# Patient Record
Sex: MALE | Race: WHITE | NOT HISPANIC OR LATINO | Employment: FULL TIME | URBAN - NONMETROPOLITAN AREA
[De-identification: names, ages, dates, MRNs, and addresses within clinical notes are randomized per-mention and may not be internally consistent; named-entity substitution may affect disease eponyms.]

---

## 2023-10-27 ENCOUNTER — TELEPHONE (OUTPATIENT)
Dept: UROLOGY | Facility: CLINIC | Age: 63
End: 2023-10-27

## 2023-10-27 ENCOUNTER — PATIENT MESSAGE (OUTPATIENT)
Dept: UROLOGY | Facility: CLINIC | Age: 63
End: 2023-10-27

## 2023-10-27 RX ORDER — METOPROLOL TARTRATE 50 MG/1
50 TABLET, FILM COATED ORAL EVERY 12 HOURS SCHEDULED
COMMUNITY

## 2023-10-27 RX ORDER — TRAMADOL HYDROCHLORIDE 50 MG/1
50 TABLET ORAL EVERY 6 HOURS PRN
COMMUNITY

## 2023-10-27 RX ORDER — CLONIDINE HYDROCHLORIDE 0.2 MG/1
0.2 TABLET ORAL 3 TIMES DAILY
COMMUNITY

## 2023-10-27 RX ORDER — TAMSULOSIN HYDROCHLORIDE 0.4 MG/1
0.4 CAPSULE ORAL
COMMUNITY

## 2023-10-27 RX ORDER — ALLOPURINOL 300 MG/1
300 TABLET ORAL DAILY
COMMUNITY

## 2023-10-27 RX ORDER — HYDROCHLOROTHIAZIDE 25 MG/1
25 TABLET ORAL 2 TIMES DAILY
COMMUNITY
End: 2023-10-27 | Stop reason: ALTCHOICE

## 2023-10-27 RX ORDER — CLONIDINE HYDROCHLORIDE 0.1 MG/1
0.1 TABLET ORAL 2 TIMES DAILY
COMMUNITY
End: 2023-10-27 | Stop reason: DRUGHIGH

## 2023-10-27 RX ORDER — VALSARTAN 160 MG/1
160 TABLET ORAL DAILY
COMMUNITY
End: 2023-10-27 | Stop reason: ALTCHOICE

## 2023-10-27 NOTE — TELEPHONE ENCOUNTER
Client called back in,Rank[facility] link sent to patient they will upload PSA results from [facility] to the chart, and bring copy in day of appt.   I also added PCP and Dr. Shelly Brasher to care team.

## 2023-10-29 PROBLEM — N40.1 BENIGN PROSTATIC HYPERPLASIA WITH LOWER URINARY TRACT SYMPTOMS: Status: ACTIVE | Noted: 2023-10-29

## 2023-10-29 PROBLEM — Z87.448 HISTORY OF HEMATURIA: Status: ACTIVE | Noted: 2023-10-29

## 2023-10-29 NOTE — PROGRESS NOTES
UROLOGY PROGRESS NOTE         NAME: Sy Yusuf  AGE: 61 y.o. SEX: male  : 1960   MRN: [id number]    DATE: 2023  TIME: 9:21 AM    Assessment and Plan   Procedures     Impression:   1. Benign prostatic hyperplasia with lower urinary tract symptoms, symptom details unspecified    2. History of hematuria    3. Urinary frequency    4. Urinary urgency    5. Nocturia    6. Decreased urine stream         Plan: Plan is a set patient up for ultrasound sizing of his prostate and cystoscopy and literature on the UroLift procedure. We will evaluate for possible candidate for the UroLift procedure patient agrees. We will also get another PSA for the yearly checkup. Chief Complaint     Chief Complaint   Patient presents with    New Patient Visit     Here to discuss enlarged prostate       History of Present Illness     HPI: Sy Yusuf is a 61y.o. year old male who presents with evaluation for BPH. Patient on Flomax. PSA 0.16    Renal ultrasound 2020 showed normal upper tracts. This was done for microscopic hematuria. I do not know if he had a cystoscopy. He was with the [address] [place] [address]. He saw Dr. Mitali Lainez    From  with [facility] urology patient was on Uroxatrol for BPH. Not see a recent PSA  Patient states does not like how he feels on Flomax a lot of nasal congestion. Also did not do well with Uroxatrol. We discussed finasteride but he is interesting in a minimally invasive surgical procedure like a UroLift. His symptoms include urgency frequency without urge incontinence. Decreased flow no hesitancy no hematuria no dysuria he has nocturia x6 or every hour. He also has incomplete emptying    No erectile dysfunction. Patient states with his history of microhematuria again he had an upper tract study but never had the cystoscopy. He states his PSA 0.16 was about a year ago.     The following portions of the patient's history were reviewed and updated as appropriate: allergies, current medications, past family history, past medical history, past social history, past surgical history and problem list.  Past Medical History:   Diagnosis Date    Anxiety     Benign prostatic hyperplasia     Gout     Hematuria     Hypertension      Past Surgical History:   Procedure Laterality Date    COLONOSCOPY      ESOPHAGOSCOPY  2021    HIP ARTHROPLASTY Bilateral     LAMINECTOMY AND MICRODISCECTOMY LUMBAR SPINE  2007    SACROILIAC JOINT FUSION       shoulder  Review of Systems     Const: Denies chills, fever and weight loss. CV: Denies chest pain. Resp: Denies SOB. GI: Denies abdominal pain, nausea and vomiting. : Denies symptoms other than stated above. Musculo: Denies back pain. Objective   /98 (BP Location: Left arm, Patient Position: Sitting)   Pulse 61   Temp 97.5 °F (36.4 °C)   Wt 109 kg (241 lb 6.4 oz)   SpO2 97%     Physical Exam  Const: Appears healthy and well developed. No signs of acute distress present. Resp: Respirations are regular and unlabored. CV: Rate is regular. Rhythm is regular. Abdomen: Abdomen is soft, nontender, and nondistended. Kidneys are not palpable. : Normal external genitalia exam prostate enlarged but smooth no masses or nodules  Psych: Patient's attitude is cooperative.  Mood is normal. Affect is normal.    Current Medications     Current Outpatient Medications:     allopurinol (ZYLOPRIM) 300 mg tablet, Take 300 mg by mouth daily, Disp: , Rfl:     Cholecalciferol (Vitamin D) 50 MCG (2000 UT) tablet, Take 2,000 Units by mouth daily, Disp: , Rfl:     cloNIDine (Catapres) 0.2 mg tablet, Take 0.2 mg by mouth 3 (three) times a day, Disp: , Rfl:     metoprolol tartrate (LOPRESSOR) 50 mg tablet, Take 50 mg by mouth every 12 (twelve) hours, Disp: , Rfl:     sertraline (ZOLOFT) 50 mg tablet, Take 50 mg by mouth daily, Disp: , Rfl:     tamsulosin (FLOMAX) 0.4 mg, Take 0.4 mg by mouth, Disp: , Rfl:     traMADol (ULTRAM) 50 mg tablet, Take 50 mg by mouth every 6 (six) hours as needed, Disp: , Rfl:     valsartan 160 mg TABS 160 mg, hydrochlorothiazide 25 mg TABS 25 mg, Take by mouth 2 (two) times a day, Disp: , Rfl:         Gertrude Martinez MD

## 2023-11-01 ENCOUNTER — OFFICE VISIT (OUTPATIENT)
Dept: UROLOGY | Facility: CLINIC | Age: 63
End: 2023-11-01
Payer: COMMERCIAL

## 2023-11-01 VITALS
WEIGHT: 241.4 LBS | DIASTOLIC BLOOD PRESSURE: 98 MMHG | TEMPERATURE: 97.5 F | SYSTOLIC BLOOD PRESSURE: 170 MMHG | HEART RATE: 61 BPM | OXYGEN SATURATION: 97 %

## 2023-11-01 DIAGNOSIS — R35.1 NOCTURIA: ICD-10-CM

## 2023-11-01 DIAGNOSIS — N40.1 BENIGN PROSTATIC HYPERPLASIA WITH LOWER URINARY TRACT SYMPTOMS, SYMPTOM DETAILS UNSPECIFIED: Primary | ICD-10-CM

## 2023-11-01 DIAGNOSIS — R39.15 URINARY URGENCY: ICD-10-CM

## 2023-11-01 DIAGNOSIS — R39.198 DECREASED URINE STREAM: ICD-10-CM

## 2023-11-01 DIAGNOSIS — R35.0 URINARY FREQUENCY: ICD-10-CM

## 2023-11-01 DIAGNOSIS — Z87.448 HISTORY OF HEMATURIA: ICD-10-CM

## 2023-11-01 PROCEDURE — 99204 OFFICE O/P NEW MOD 45 MIN: CPT | Performed by: UROLOGY

## 2023-11-01 RX ORDER — CHOLECALCIFEROL (VITAMIN D3) 50 MCG
2000 TABLET ORAL DAILY
COMMUNITY
End: 2023-11-03 | Stop reason: ALTCHOICE

## 2023-11-03 ENCOUNTER — TELEPHONE (OUTPATIENT)
Dept: UROLOGY | Facility: CLINIC | Age: 63
End: 2023-11-03

## 2023-11-03 NOTE — TELEPHONE ENCOUNTER
Received in box message from patient with current medication list. Meds updated per patient request.

## 2023-11-03 NOTE — TELEPHONE ENCOUNTER
----- Message from Sri Myers sent at 11/2/2023  9:18 PM EDT -----  Regarding: Alicia/[facility]  Contact: [phone]  The attached medicine list is current. The only thing that should have been added was vitamin d 50mcg (2000iu).

## 2023-11-04 LAB — PSA SERPL-MCNC: 0.22 NG/ML

## 2023-11-14 NOTE — H&P (VIEW-ONLY)
UROLOGY PROGRESS NOTE         NAME: Epifanio Lainez  AGE: 61 y.o. SEX: male  : 1960   MRN: [id number]    DATE: 2023  TIME: 6:05 AM    Assessment and Plan      Cystoscopy     Date/Time  2023 1:30 PM     Performed by  Fred Parsons MD   Authorized by  Fred Parsons MD         Procedure Details:  Procedure type: cystoscopy    Additional Procedure Details: Patient was placed in left lateral cubitus position and transrectal ultrasound was used to measure the prostate in the AP and lateral views with a finding of 30 g prostate. Patient then placed prone cystoscopy was carried out after he was prepped and draped in usual sterile fashion. He had a normal anterior and posterior urethra he had he had enlarged lateral lobes of his prostate with a median size median lobe. There was no median lobe component. The bladder was mild trabeculation otherwise normal.  No tumor no stones no cystitis. Patient tolerated well       Impression:   1. Benign prostatic hyperplasia with urinary frequency    2. History of hematuria    3. Urinary frequency    4. Urinary urgency    5. Nocturia    6. Decreased urine stream         Plan: I think the patient is a good candidate for either Rezum or UroLift. I went over the procedures and the risk and benefits and the patient elects to the UroLift procedure likely 4 to 6 units. Chief Complaint   No chief complaint on file. History of Present Illness     HPI: Epifanio Lainez is a 61y.o. year old male who presents with follow-up office visit 2023. Patient on Flomax with a history of BPH and a normal PSA of 0.16. Patient is seen [facility] urology was on Uroxatrol. He is also seen Dr. Maria D Angel in the past for microhematuria. Flomax causes nasal congestion and did not do well with Uroxatrol and we discussed other medical options but he is possibly interested in a UroLift. He is here for sizing of prostate and cystoscopy.   His symptoms include frequency urgency without urge incontinence nocturia x6. Also incomplete emptying and decreased stream.    Please note his recent PSA was 0.22 on 11/3/2023              The following portions of the patient's history were reviewed and updated as appropriate: allergies, current medications, past family history, past medical history, past social history, past surgical history and problem list.  Past Medical History:   Diagnosis Date    Anxiety     Benign prostatic hyperplasia     Gout     Hematuria     Hypertension      Past Surgical History:   Procedure Laterality Date    COLONOSCOPY      ESOPHAGOSCOPY  2021    HIP ARTHROPLASTY Bilateral     LAMINECTOMY AND MICRODISCECTOMY LUMBAR SPINE  2007    SACROILIAC JOINT FUSION       shoulder  Review of Systems     Const: Denies chills, fever and weight loss. CV: Denies chest pain. Resp: Denies SOB. GI: Denies abdominal pain, nausea and vomiting. : Denies symptoms other than stated above. Musculo: Denies back pain. Objective   There were no vitals taken for this visit. Physical Exam  Const: Appears healthy and well developed. No signs of acute distress present. Resp: Respirations are regular and unlabored. CV: Rate is regular. Rhythm is regular. Abdomen: Abdomen is soft, nontender, and nondistended. Kidneys are not palpable. : nl  Psych: Patient's attitude is cooperative.  Mood is normal. Affect is normal.    Current Medications     Current Outpatient Medications:     allopurinol (ZYLOPRIM) 300 mg tablet, Take 300 mg by mouth daily, Disp: , Rfl:     cloNIDine (Catapres) 0.2 mg tablet, Take 0.2 mg by mouth 3 (three) times a day, Disp: , Rfl:     metoprolol tartrate (LOPRESSOR) 50 mg tablet, Take 50 mg by mouth every 12 (twelve) hours, Disp: , Rfl:     sertraline (ZOLOFT) 50 mg tablet, Take 50 mg by mouth daily, Disp: , Rfl:     tamsulosin (FLOMAX) 0.4 mg, Take 0.4 mg by mouth, Disp: , Rfl:     traMADol (ULTRAM) 50 mg tablet, Take 50 mg by mouth every 6 (six) hours as needed, Disp: , Rfl:     valsartan 160 mg TABS 160 mg, hydrochlorothiazide 25 mg TABS 25 mg, Take by mouth 2 (two) times a day, Disp: , Rfl:         Fred Parsons MD

## 2023-11-14 NOTE — PROGRESS NOTES
UROLOGY PROGRESS NOTE         NAME: Hugh Gutierrez  AGE: 61 y.o. SEX: male  : 1960   MRN: [id number]    DATE: 2023  TIME: 6:05 AM    Assessment and Plan      Cystoscopy     Date/Time  2023 1:30 PM     Performed by  Gris Garcia MD   Authorized by  Gris Garcia MD         Procedure Details:  Procedure type: cystoscopy    Additional Procedure Details: Patient was placed in left lateral cubitus position and transrectal ultrasound was used to measure the prostate in the AP and lateral views with a finding of 30 g prostate. Patient then placed prone cystoscopy was carried out after he was prepped and draped in usual sterile fashion. He had a normal anterior and posterior urethra he had he had enlarged lateral lobes of his prostate with a median size median lobe. There was no median lobe component. The bladder was mild trabeculation otherwise normal.  No tumor no stones no cystitis. Patient tolerated well       Impression:   1. Benign prostatic hyperplasia with urinary frequency    2. History of hematuria    3. Urinary frequency    4. Urinary urgency    5. Nocturia    6. Decreased urine stream         Plan: I think the patient is a good candidate for either Rezum or UroLift. I went over the procedures and the risk and benefits and the patient elects to the UroLift procedure likely 4 to 6 units. Chief Complaint   No chief complaint on file. History of Present Illness     HPI: Hugh Gutierrez is a 61y.o. year old male who presents with follow-up office visit 2023. Patient on Flomax with a history of BPH and a normal PSA of 0.16. Patient is seen [facility] urology was on Uroxatrol. He is also seen Dr. Dina Walker in the past for microhematuria. Flomax causes nasal congestion and did not do well with Uroxatrol and we discussed other medical options but he is possibly interested in a UroLift. He is here for sizing of prostate and cystoscopy.   His symptoms include frequency urgency without urge incontinence nocturia x6. Also incomplete emptying and decreased stream.    Please note his recent PSA was 0.22 on 11/3/2023              The following portions of the patient's history were reviewed and updated as appropriate: allergies, current medications, past family history, past medical history, past social history, past surgical history and problem list.  Past Medical History:   Diagnosis Date    Anxiety     Benign prostatic hyperplasia     Gout     Hematuria     Hypertension      Past Surgical History:   Procedure Laterality Date    COLONOSCOPY      ESOPHAGOSCOPY  2021    HIP ARTHROPLASTY Bilateral     LAMINECTOMY AND MICRODISCECTOMY LUMBAR SPINE  2007    SACROILIAC JOINT FUSION       shoulder  Review of Systems     Const: Denies chills, fever and weight loss. CV: Denies chest pain. Resp: Denies SOB. GI: Denies abdominal pain, nausea and vomiting. : Denies symptoms other than stated above. Musculo: Denies back pain. Objective   There were no vitals taken for this visit. Physical Exam  Const: Appears healthy and well developed. No signs of acute distress present. Resp: Respirations are regular and unlabored. CV: Rate is regular. Rhythm is regular. Abdomen: Abdomen is soft, nontender, and nondistended. Kidneys are not palpable. : nl  Psych: Patient's attitude is cooperative.  Mood is normal. Affect is normal.    Current Medications     Current Outpatient Medications:     allopurinol (ZYLOPRIM) 300 mg tablet, Take 300 mg by mouth daily, Disp: , Rfl:     cloNIDine (Catapres) 0.2 mg tablet, Take 0.2 mg by mouth 3 (three) times a day, Disp: , Rfl:     metoprolol tartrate (LOPRESSOR) 50 mg tablet, Take 50 mg by mouth every 12 (twelve) hours, Disp: , Rfl:     sertraline (ZOLOFT) 50 mg tablet, Take 50 mg by mouth daily, Disp: , Rfl:     tamsulosin (FLOMAX) 0.4 mg, Take 0.4 mg by mouth, Disp: , Rfl:     traMADol (ULTRAM) 50 mg tablet, Take 50 mg by mouth every 6 (six) hours as needed, Disp: , Rfl:     valsartan 160 mg TABS 160 mg, hydrochlorothiazide 25 mg TABS 25 mg, Take by mouth 2 (two) times a day, Disp: , Rfl:         Monica Huffman MD

## 2023-11-16 ENCOUNTER — PROCEDURE VISIT (OUTPATIENT)
Dept: UROLOGY | Facility: CLINIC | Age: 63
End: 2023-11-16
Payer: COMMERCIAL

## 2023-11-16 ENCOUNTER — PREP FOR PROCEDURE (OUTPATIENT)
Dept: UROLOGY | Facility: CLINIC | Age: 63
End: 2023-11-16

## 2023-11-16 VITALS
HEART RATE: 59 BPM | HEIGHT: 67 IN | SYSTOLIC BLOOD PRESSURE: 150 MMHG | DIASTOLIC BLOOD PRESSURE: 86 MMHG | OXYGEN SATURATION: 100 % | BODY MASS INDEX: 37.67 KG/M2 | TEMPERATURE: 97 F | WEIGHT: 240 LBS

## 2023-11-16 DIAGNOSIS — R35.0 BENIGN PROSTATIC HYPERPLASIA WITH URINARY FREQUENCY: Primary | ICD-10-CM

## 2023-11-16 DIAGNOSIS — R35.1 NOCTURIA: ICD-10-CM

## 2023-11-16 DIAGNOSIS — Z01.818 ENCOUNTER FOR PREADMISSION TESTING: Primary | ICD-10-CM

## 2023-11-16 DIAGNOSIS — R39.89 SUSPECTED UTI: ICD-10-CM

## 2023-11-16 DIAGNOSIS — Z87.448 HISTORY OF HEMATURIA: ICD-10-CM

## 2023-11-16 DIAGNOSIS — R35.0 URINARY FREQUENCY: ICD-10-CM

## 2023-11-16 DIAGNOSIS — R39.198 DECREASED URINE STREAM: ICD-10-CM

## 2023-11-16 DIAGNOSIS — R39.15 URINARY URGENCY: ICD-10-CM

## 2023-11-16 DIAGNOSIS — N40.1 BENIGN PROSTATIC HYPERPLASIA WITH URINARY FREQUENCY: Primary | ICD-10-CM

## 2023-11-16 PROCEDURE — 52000 CYSTOURETHROSCOPY: CPT | Performed by: UROLOGY

## 2023-11-16 PROCEDURE — 76872 US TRANSRECTAL: CPT | Performed by: UROLOGY

## 2023-11-16 RX ORDER — OMEGA-3S/DHA/EPA/FISH OIL/D3 300MG-1000
2000 CAPSULE ORAL DAILY
COMMUNITY

## 2023-11-27 ENCOUNTER — PATIENT MESSAGE (OUTPATIENT)
Dept: UROLOGY | Facility: CLINIC | Age: 63
End: 2023-11-27

## 2023-11-30 ENCOUNTER — TELEPHONE (OUTPATIENT)
Dept: UROLOGY | Facility: CLINIC | Age: 63
End: 2023-11-30

## 2023-11-30 NOTE — TELEPHONE ENCOUNTER
----- Message from Parish So sent at 11/30/2023  2:11 PM EST -----  Regarding: Test results   Contact: [phone]  I see in my chart that you got my results of the urine culture from [facility]. Did you also receive my blood work results? If not, I will send them to you.

## 2023-12-13 RX ORDER — MULTIVITAMIN
1 TABLET ORAL DAILY
COMMUNITY

## 2023-12-13 RX ORDER — ACETAMINOPHEN 500 MG
1000 TABLET ORAL EVERY 6 HOURS PRN
COMMUNITY

## 2023-12-13 NOTE — PRE-PROCEDURE INSTRUCTIONS
Pre-Surgery Instructions:   Medication Instructions    acetaminophen (TYLENOL) 500 mg tablet Uses PRN- OK to take day of surgery    allopurinol (ZYLOPRIM) 300 mg tablet Take day of surgery. cholecalciferol (VITAMIN D3) 400 units tablet Stop taking 7 days prior to surgery. cloNIDine (Catapres) 0.2 mg tablet Take day of surgery. metoprolol tartrate (LOPRESSOR) 50 mg tablet Take day of surgery. Multiple Vitamin (multivitamin) tablet Stop taking 7 days prior to surgery. sertraline (ZOLOFT) 50 mg tablet Take day of surgery. tamsulosin (FLOMAX) 0.4 mg Hold day of surgery. traMADol (ULTRAM) 50 mg tablet Uses PRN- OK to take day of surgery    valsartan 160 mg TABS 160 mg, hydrochlorothiazide 25 mg TABS 25 mg Hold day of surgery. Medication instructions for day surgery reviewed. Please use only a sip of water to take your instructed medications. Avoid all over the counter vitamins, supplements and NSAIDS for one week prior to surgery per anesthesia guidelines. Tylenol is ok to take as needed. You will receive a call one business day prior to surgery with an arrival time and hospital directions. If your surgery is scheduled on a Monday, the hospital will be calling you on the Friday prior to your surgery. If you have not heard from anyone by 8pm, please call the hospital supervisor through the hospital  at [phone]. Octaviano Abbott [phone]). Do not eat or drink anything after midnight the night before your surgery, including candy, mints, lifesavers, or chewing gum. Do not drink alcohol 24hrs before your surgery. Try not to smoke at least 24hrs before your surgery. Follow the pre surgery showering instructions as listed in the [facility] Surgical Experience Booklet” or otherwise provided by your surgeon's office. Do not use a blade to shave the surgical area 1 week before surgery. It is okay to use a clean electric clippers up to 24 hours before surgery.  Do not apply any lotions, creams, including makeup, cologne, deodorant, or perfumes after showering on the day of your surgery. Do not use dry shampoo, hair spray, hair gel, or any type of hair products. No contact lenses, eye make-up, or artificial eyelashes. Remove nail polish, including gel polish, and any artificial, gel, or acrylic nails if possible. Remove all jewelry including rings and body piercing jewelry. Wear causal clothing that is easy to take on and off. Consider your type of surgery. Keep any valuables, jewelry, piercings at home. Please bring any specially ordered equipment (sling, braces) if indicated. Arrange for a responsible person to drive you to and from the hospital on the day of your surgery. Visitor Guidelines discussed. Call the surgeon's office with any new illnesses, exposures, or additional questions prior to surgery. Please reference your [facility] Surgical Experience Booklet” for additional information to prepare for your upcoming surgery.

## 2023-12-15 ENCOUNTER — ANESTHESIA EVENT (OUTPATIENT)
Dept: PERIOP | Facility: HOSPITAL | Age: 63
End: 2023-12-15
Payer: COMMERCIAL

## 2023-12-15 ENCOUNTER — ANESTHESIA (OUTPATIENT)
Dept: PERIOP | Facility: HOSPITAL | Age: 63
End: 2023-12-15
Payer: COMMERCIAL

## 2023-12-15 ENCOUNTER — HOSPITAL ENCOUNTER (OUTPATIENT)
Facility: HOSPITAL | Age: 63
Setting detail: OUTPATIENT SURGERY
Discharge: HOME | End: 2023-12-15
Attending: UROLOGY | Admitting: UROLOGY
Payer: COMMERCIAL

## 2023-12-15 VITALS
WEIGHT: 240 LBS | HEART RATE: 62 BPM | DIASTOLIC BLOOD PRESSURE: 58 MMHG | BODY MASS INDEX: 37.67 KG/M2 | HEIGHT: 67 IN | TEMPERATURE: 97.6 F | SYSTOLIC BLOOD PRESSURE: 128 MMHG | OXYGEN SATURATION: 95 % | RESPIRATION RATE: 16 BRPM

## 2023-12-15 DIAGNOSIS — R35.0 BENIGN PROSTATIC HYPERPLASIA WITH URINARY FREQUENCY: Primary | ICD-10-CM

## 2023-12-15 DIAGNOSIS — N40.1 BENIGN PROSTATIC HYPERPLASIA WITH URINARY FREQUENCY: Primary | ICD-10-CM

## 2023-12-15 PROBLEM — I10 HYPERTENSION: Status: ACTIVE | Noted: 2023-12-15

## 2023-12-15 PROBLEM — R11.2 PONV (POSTOPERATIVE NAUSEA AND VOMITING): Status: ACTIVE | Noted: 2023-12-15

## 2023-12-15 PROBLEM — F32.A DEPRESSION: Status: ACTIVE | Noted: 2023-12-15

## 2023-12-15 PROBLEM — F41.9 ANXIETY: Status: ACTIVE | Noted: 2023-12-15

## 2023-12-15 PROBLEM — Z98.890 PONV (POSTOPERATIVE NAUSEA AND VOMITING): Status: ACTIVE | Noted: 2023-12-15

## 2023-12-15 PROBLEM — G89.4 CHRONIC PAIN DISORDER: Status: ACTIVE | Noted: 2023-12-15

## 2023-12-15 PROCEDURE — 52441 CYSTO INSJ TRNSPRSTC 1 IMPLT: CPT | Performed by: UROLOGY

## 2023-12-15 PROCEDURE — 52442 CYSTO INS TRNSPRSTC IMPLT EA: CPT | Performed by: UROLOGY

## 2023-12-15 PROCEDURE — L8699 PROSTHETIC IMPLANT NOS: HCPCS | Performed by: UROLOGY

## 2023-12-15 DEVICE — IMPLANT CARTRIDGE UROLIFT 2 HANDLE KIT: Type: IMPLANTABLE DEVICE | Site: URETHRA | Status: FUNCTIONAL

## 2023-12-15 DEVICE — IMPLANT CARTRIDGE UROLIFT 2: Type: IMPLANTABLE DEVICE | Site: URETHRA | Status: FUNCTIONAL

## 2023-12-15 RX ORDER — PHENAZOPYRIDINE HYDROCHLORIDE 200 MG/1
200 TABLET, FILM COATED ORAL
Qty: 10 TABLET | Refills: 0 | Status: SHIPPED | OUTPATIENT
Start: 2023-12-15

## 2023-12-15 RX ORDER — PROPOFOL 10 MG/ML
INJECTION, EMULSION INTRAVENOUS AS NEEDED
Status: DISCONTINUED | OUTPATIENT
Start: 2023-12-15 | End: 2023-12-15

## 2023-12-15 RX ORDER — ONDANSETRON 2 MG/ML
INJECTION INTRAMUSCULAR; INTRAVENOUS AS NEEDED
Status: DISCONTINUED | OUTPATIENT
Start: 2023-12-15 | End: 2023-12-15

## 2023-12-15 RX ORDER — KETAMINE HCL IN NACL, ISO-OSM 100MG/10ML
SYRINGE (ML) INJECTION AS NEEDED
Status: DISCONTINUED | OUTPATIENT
Start: 2023-12-15 | End: 2023-12-15

## 2023-12-15 RX ORDER — MIDAZOLAM HYDROCHLORIDE 2 MG/2ML
INJECTION, SOLUTION INTRAMUSCULAR; INTRAVENOUS AS NEEDED
Status: DISCONTINUED | OUTPATIENT
Start: 2023-12-15 | End: 2023-12-15

## 2023-12-15 RX ORDER — ONDANSETRON 2 MG/ML
4 INJECTION INTRAMUSCULAR; INTRAVENOUS ONCE AS NEEDED
Status: DISCONTINUED | OUTPATIENT
Start: 2023-12-15 | End: 2023-12-15 | Stop reason: HOSPADM

## 2023-12-15 RX ORDER — FENTANYL CITRATE 50 UG/ML
INJECTION, SOLUTION INTRAMUSCULAR; INTRAVENOUS AS NEEDED
Status: DISCONTINUED | OUTPATIENT
Start: 2023-12-15 | End: 2023-12-15

## 2023-12-15 RX ORDER — HYDROCODONE BITARTRATE AND ACETAMINOPHEN 5; 325 MG/1; MG/1
1 TABLET ORAL EVERY 6 HOURS PRN
Status: DISCONTINUED | OUTPATIENT
Start: 2023-12-15 | End: 2023-12-15 | Stop reason: HOSPADM

## 2023-12-15 RX ORDER — SODIUM CHLORIDE, SODIUM LACTATE, POTASSIUM CHLORIDE, CALCIUM CHLORIDE 600; 310; 30; 20 MG/100ML; MG/100ML; MG/100ML; MG/100ML
INJECTION, SOLUTION INTRAVENOUS CONTINUOUS PRN
Status: DISCONTINUED | OUTPATIENT
Start: 2023-12-15 | End: 2023-12-15

## 2023-12-15 RX ORDER — PHENAZOPYRIDINE HYDROCHLORIDE 100 MG/1
200 TABLET, FILM COATED ORAL ONCE AS NEEDED
Status: DISCONTINUED | OUTPATIENT
Start: 2023-12-15 | End: 2023-12-15 | Stop reason: HOSPADM

## 2023-12-15 RX ORDER — CEFAZOLIN SODIUM 2 G/50ML
2000 SOLUTION INTRAVENOUS ONCE
Status: COMPLETED | OUTPATIENT
Start: 2023-12-15 | End: 2023-12-15

## 2023-12-15 RX ORDER — CEFUROXIME AXETIL 500 MG/1
500 TABLET ORAL EVERY 12 HOURS SCHEDULED
Qty: 6 TABLET | Refills: 0 | Status: SHIPPED | OUTPATIENT
Start: 2023-12-15 | End: 2023-12-18

## 2023-12-15 RX ADMIN — PROPOFOL 20 MG: 10 INJECTION, EMULSION INTRAVENOUS at 12:16

## 2023-12-15 RX ADMIN — FENTANYL CITRATE 50 MCG: 50 INJECTION INTRAMUSCULAR; INTRAVENOUS at 12:11

## 2023-12-15 RX ADMIN — PROPOFOL 20 MG: 10 INJECTION, EMULSION INTRAVENOUS at 12:14

## 2023-12-15 RX ADMIN — PROPOFOL 50 MG: 10 INJECTION, EMULSION INTRAVENOUS at 12:11

## 2023-12-15 RX ADMIN — PROPOFOL 30 MG: 10 INJECTION, EMULSION INTRAVENOUS at 12:23

## 2023-12-15 RX ADMIN — ONDANSETRON 4 MG: 2 INJECTION INTRAMUSCULAR; INTRAVENOUS at 12:25

## 2023-12-15 RX ADMIN — Medication 20 MG: at 12:18

## 2023-12-15 RX ADMIN — MIDAZOLAM 2 MG: 1 INJECTION INTRAMUSCULAR; INTRAVENOUS at 12:05

## 2023-12-15 RX ADMIN — PROPOFOL 30 MG: 10 INJECTION, EMULSION INTRAVENOUS at 12:22

## 2023-12-15 RX ADMIN — CEFAZOLIN SODIUM 2000 MG: 2 SOLUTION INTRAVENOUS at 12:12

## 2023-12-15 RX ADMIN — SODIUM CHLORIDE 8 MCG: 9 INJECTION, SOLUTION INTRAVENOUS at 12:28

## 2023-12-15 RX ADMIN — SODIUM CHLORIDE 12 MCG: 9 INJECTION, SOLUTION INTRAVENOUS at 12:05

## 2023-12-15 RX ADMIN — SODIUM CHLORIDE, SODIUM LACTATE, POTASSIUM CHLORIDE, AND CALCIUM CHLORIDE: .6; .31; .03; .02 INJECTION, SOLUTION INTRAVENOUS at 12:04

## 2023-12-15 RX ADMIN — PROPOFOL 50 MG: 10 INJECTION, EMULSION INTRAVENOUS at 12:12

## 2023-12-15 NOTE — INTERVAL H&P NOTE
H&P reviewed. After examining the patient I find no changes in the patients condition since the H&P had been written.     Vitals:    12/15/23 1033   BP: 166/88   Pulse: (!) 50   Resp: 18   Temp: 97.8 °F (36.6 °C)   SpO2: 97%

## 2023-12-15 NOTE — OP NOTE
OPERATIVE REPORT  PATIENT NAME: Demetrius Srivastava    :  1960  MRN: [id number]  Pt Location: OW OR ROOM 02    SURGERY DATE: 12/15/2023    Surgeons and Role:     Rissa Morrow MD - Primary    Preop Diagnosis:  Benign prostatic hyperplasia with urinary frequency N40.1, R35.0    Post-Op Diagnosis Codes: * Benign prostatic hyperplasia with urinary frequency [N40.1, R35.0]    Procedure(s):  CYSTOSCOPY WITH INSERTION UROLIFT    Specimen(s):  * No specimens in log *    Estimated Blood Loss:   Minimal    Drains:  * No LDAs found *    Anesthesia Type:   IV Sedation with Anesthesia    Operative Indications:  Benign prostatic hyperplasia with urinary frequency N40.1, R35.0      Operative Findings:  Normal anterior and posterior urethra enlarged lateral lobes with a small fossa. Trabeculated bladder but no stones no acute cystitis. Complications:   None    Procedure and Technique:  Patient was placed in dorsolithotomy position prepped draped you sterile fashion. Using the UroLift cystoscope and a 30 degree lens the above findings were noted. We then placed 4 total UroLift units 2 in the right 2 on the left we started at the very very you and stacked them to the mid gland to get a nice anterior channel there was some mild bleeding patient tolerated the procedure well. I was present for the entire procedure.     Patient Disposition:  hemodynamically stable        SIGNATURE: Rissa Morrow MD  DATE: December 15, 2023  TIME: 12:32 PM

## 2023-12-15 NOTE — ANESTHESIA PREPROCEDURE EVALUATION
Procedure:  CYSTOSCOPY WITH INSERTION UROLIFT (Urethra)    Relevant Problems   ANESTHESIA   (+) PONV (postoperative nausea and vomiting)      CARDIO   (+) Hypertension      /RENAL   (+) Benign prostatic hyperplasia with lower urinary tract symptoms      NEURO/PSYCH   (+) Anxiety   (+) Chronic pain disorder   (+) Depression        Physical Exam    Airway    Mallampati score: II  TM Distance: >3 FB  Neck ROM: full     Dental   No notable dental hx     Cardiovascular  Rhythm: regular, Rate: normal    Pulmonary   Breath sounds clear to auscultation    Other Findings        Anesthesia Plan  ASA Score- 2     Anesthesia Type- IV sedation with anesthesia with ASA Monitors. Additional Monitors:     Airway Plan:            Plan Factors-Exercise tolerance (METS): >4 METS. Chart reviewed. Patient summary reviewed. Patient is not a current smoker. Obstructive sleep apnea risk education given perioperatively. Induction-     Postoperative Plan-     Informed Consent- Anesthetic plan and risks discussed with patient and daughter. I personally reviewed this patient with the CRNA. Discussed and agreed on the Anesthesia Plan with the CRNA. Kendrick Lennox

## 2023-12-15 NOTE — ANESTHESIA POSTPROCEDURE EVALUATION
Post-Op Assessment Note    CV Status:  Stable    Pain management: adequate    Multimodal analgesia used between 6 hours prior to anesthesia start to PACU discharge    Mental Status:  Awake and alert   Hydration Status:  Stable   PONV Controlled:  None   Airway Patency:  Patent  Two or more mitigation strategies used for obstructive sleep apnea   Post Op Vitals Reviewed: Yes      Staff: CRNA               /67 (12/15/23 1239)    Temp 97.9 °F (36.6 °C) (12/15/23 1239)    Pulse 58 (12/15/23 1239)   Resp 16 (12/15/23 1239)    SpO2 95 % (12/15/23 1239)

## 2023-12-15 NOTE — DISCHARGE INSTR - AVS FIRST PAGE
Follow-up with Dr. Jose Navarrete to be arranged. Heating pad of the penis and bladder area is helpful for bladder discomfort. Will passed some blood in the urine and some blood clots please drink extra water and rest.  Symptoms of burning, urgency, frequency, intermittent blood in the urine can last anywhere from 7 days to 14 days as the normal.  Each week expect your symptoms to improve. Prescription sent in for Pyridium returns urine orange is for burning. Also sent an antibiotic for a few days to reduce the risk of infection. Call the office fever greater than 102 increased bleeding or pain.   Report to the ER for chest pain shortness of breath leg swelling or medical concerns

## 2024-01-19 RX ORDER — VALSARTAN AND HYDROCHLOROTHIAZIDE 160; 25 MG/1; MG/1
TABLET ORAL
COMMUNITY
Start: 2023-11-13

## 2024-01-24 PROBLEM — Z09 POSTOP CHECK: Status: ACTIVE | Noted: 2024-01-24

## 2024-01-24 NOTE — PROGRESS NOTES
UROLOGY PROGRESS NOTE         NAME: Erik Thao  AGE: 63 y.o. SEX: male  : 1960   MRN: [id number]    DATE: 2024  TIME: 4:15 PM    Assessment and Plan   Procedures     Impression:   1. Postop check    2. Benign prostatic hyperplasia with urinary frequency    3. History of hematuria    4. Urinary frequency    5. Urinary urgency    6. Nocturia    7. Decreased urine stream         Plan: Stop Flomax as patient did.  Patient is status post UroLift procedure on 12/15/2023 doing very well.    Will see patient back in 2024 for DANIEL PSA.      Chief Complaint   No chief complaint on file.    History of Present Illness     HPI: Erik Thao is a 63 y.o. year old male who presents with follow-up from surgical procedure on 12/15/2023 status post UroLift procedure for BPH.  Patient had a total of 4 units placed.  He is currently on Flomax.    Patient is here for postop check for voiding function.  Patient has had a history of urinary frequency urgency nocturia and decreased stream.    PSA was 0.22 on 11/3/2023  Patient doing well from the UroLift procedure, he stopped his Flomax a week ago very happy with his results.  Having no bothersome irritable or obstructive voiding complaints.    PVR was 48 cc cc            The following portions of the patient's history were reviewed and updated as appropriate: allergies, current medications, past family history, past medical history, past social history, past surgical history and problem list.  Past Medical History:   Diagnosis Date    Anxiety 12/15/2023    Benign prostatic hyperplasia     Chronic pain disorder 12/15/2023    lower back pain    Depression 12/15/2023    Gout     Hematuria     Hypertension 12/15/2023    PONV (postoperative nausea and vomiting) 12/15/2023    pt had nausea after one hip replacement    Wears contact lenses      Past Surgical History:   Procedure Laterality Date    COLONOSCOPY      ESOPHAGOSCOPY      HIP ARTHROPLASTY  Bilateral     LAMINECTOMY AND MICRODISCECTOMY LUMBAR SPINE  2007    [place] CYSTO INSERTION TRANSPROSTATIC IMPLANT SINGLE N/A 12/15/2023    Procedure: CYSTOSCOPY WITH INSERTION UROLIFT;  Surgeon: Osmin Prince MD;  Location: [facility];  Service: Urology    SACROILIAC JOINT FUSION       shoulder  Review of Systems     Const: Denies chills, fever and weight loss.  CV: Denies chest pain.  Resp: Denies SOB.  GI: Denies abdominal pain, nausea and vomiting.  : Denies symptoms other than stated above.  Musculo: Denies back pain.    Objective   There were no vitals taken for this visit.    Physical Exam  Const: Appears healthy and well developed. No signs of acute distress present.  Resp: Respirations are regular and unlabored.   CV: Rate is regular. Rhythm is regular.  Abdomen: Abdomen is soft, nontender, and nondistended. Kidneys are not palpable.  : No evidence of meatal stenosis  Psych: Patient's attitude is cooperative. Mood is normal. Affect is normal.    Current Medications     Current Outpatient Medications:     acetaminophen (TYLENOL) 500 mg tablet, Take 1,000 mg by mouth every 6 (six) hours as needed for mild pain, Disp: , Rfl:     allopurinol (ZYLOPRIM) 300 mg tablet, Take 300 mg by mouth daily, Disp: , Rfl:     cholecalciferol (VITAMIN D3) 400 units tablet, Take 2,000 Units by mouth daily, Disp: , Rfl:     cloNIDine (Catapres) 0.2 mg tablet, Take 0.2 mg by mouth 3 (three) times a day, Disp: , Rfl:     metoprolol tartrate (LOPRESSOR) 50 mg tablet, Take 50 mg by mouth every 12 (twelve) hours, Disp: , Rfl:     Multiple Vitamin (multivitamin) tablet, Take 1 tablet by mouth daily, Disp: , Rfl:     phenazopyridine (PYRIDIUM) 200 mg tablet, Take 1 tablet (200 mg total) by mouth 3 (three) times a day with meals, Disp: 10 tablet, Rfl: 0    sertraline (ZOLOFT) 50 mg tablet, Take 50 mg by mouth daily, Disp: , Rfl:     tamsulosin (FLOMAX) 0.4 mg, Take 0.4 mg by mouth daily with dinner, Disp: , Rfl:     traMADol (ULTRAM) 50  mg tablet, Take 50 mg by mouth every 6 (six) hours as needed, Disp: , Rfl:     valsartan 160 mg TABS 160 mg, hydrochlorothiazide 25 mg TABS 25 mg, Take 1 tablet by mouth 2 (two) times a day, Disp: , Rfl:     valsartan-hydrochlorothiazide (DIOVAN-HCT) 160-25 MG per tablet, , Disp: , Rfl:         Osmin Prince MD

## 2024-01-30 ENCOUNTER — OFFICE VISIT (OUTPATIENT)
Dept: UROLOGY | Facility: CLINIC | Age: 64
End: 2024-01-30
Payer: COMMERCIAL

## 2024-01-30 VITALS
HEART RATE: 63 BPM | DIASTOLIC BLOOD PRESSURE: 90 MMHG | OXYGEN SATURATION: 99 % | TEMPERATURE: 97.9 F | SYSTOLIC BLOOD PRESSURE: 164 MMHG | BODY MASS INDEX: 37.59 KG/M2 | HEIGHT: 67 IN

## 2024-01-30 DIAGNOSIS — R35.0 URINARY FREQUENCY: ICD-10-CM

## 2024-01-30 DIAGNOSIS — N40.1 BENIGN PROSTATIC HYPERPLASIA WITH URINARY FREQUENCY: ICD-10-CM

## 2024-01-30 DIAGNOSIS — R35.0 BENIGN PROSTATIC HYPERPLASIA WITH URINARY FREQUENCY: ICD-10-CM

## 2024-01-30 DIAGNOSIS — R35.1 NOCTURIA: ICD-10-CM

## 2024-01-30 DIAGNOSIS — Z87.448 HISTORY OF HEMATURIA: ICD-10-CM

## 2024-01-30 DIAGNOSIS — Z09 POSTOP CHECK: Primary | ICD-10-CM

## 2024-01-30 DIAGNOSIS — R39.198 DECREASED URINE STREAM: ICD-10-CM

## 2024-01-30 DIAGNOSIS — R39.15 URINARY URGENCY: ICD-10-CM

## 2024-01-30 LAB — POST-VOID RESIDUAL VOLUME, ML POC: 48 ML

## 2024-01-30 PROCEDURE — 99214 OFFICE O/P EST MOD 30 MIN: CPT | Performed by: UROLOGY

## 2024-01-30 PROCEDURE — 51798 US URINE CAPACITY MEASURE: CPT | Performed by: UROLOGY

## 2024-10-18 LAB — PSA SERPL-MCNC: 0.15 NG/ML

## 2024-10-22 ENCOUNTER — TELEPHONE (OUTPATIENT)
Dept: UROLOGY | Facility: CLINIC | Age: 64
End: 2024-10-22

## 2024-10-22 RX ORDER — HYDROCHLOROTHIAZIDE 25 MG/1
25 TABLET ORAL 2 TIMES DAILY
COMMUNITY

## 2024-10-22 RX ORDER — HYDROCORTISONE 25 MG/G
CREAM TOPICAL
COMMUNITY
Start: 2024-07-16

## 2024-10-22 RX ORDER — VALSARTAN 160 MG/1
160 TABLET ORAL DAILY
COMMUNITY

## 2024-10-24 NOTE — TELEPHONE ENCOUNTER
Patient returned call about his PSA. He had it done at [facility] Last week. I was able to downloaded through [facility]. No further action required.

## 2024-10-28 NOTE — PROGRESS NOTES
UROLOGY PROGRESS NOTE         NAME: Erik Thao  AGE: 64 y.o. SEX: male  : 1960   MRN: [id number]    DATE: 2024  TIME: 11:40 AM    Assessment and Plan   Procedures     Impression:   1. Benign prostatic hyperplasia with nocturia  2. Renal calculi  -     CT renal stone study abdomen pelvis wo contrast; Future; Expected date: 2024  3. Right flank pain  -     CT renal stone study abdomen pelvis wo contrast; Future; Expected date: 2024  4. Nocturia  -     PSA Total, Diagnostic; Future       Plan: Will plan to see patient back in 1 year with PSA 1 week prior to appointment.  But we will set him up for CT stone protocol and let him know the results to assess for nephrolithiasis.  Patient agrees.  Also give him a [place] diet sheet.      Chief Complaint     Chief Complaint   Patient presents with    Nephrolithiasis     Passed stones sept 14-     Benign Prostatic Hypertrophy    Urinary Retention    Urinary Urgency     History of Present Illness     HPI: Erik Thao is a 64 y.o. year old male who presents with follow-up.  Patient's status post UroLift on 12/15/2023 for unit procedure.  Patient was doing well, preoperatively had frequency, urgency nocturia and decreased stream.    PSA last year 11/3/2023 was 0.22.  PSA 10/17/2024 was excellent 0.15.  We had stopped his Flomax last year.  Currently patient did pass a couple stones had some right flank pain.  We talked about getting a CT stone protocol to assess for nephrolithiasis.  He was having some urgency is improving though otherwise doing well from his UroLift.  Discussed his PSA results.            The following portions of the patient's history were reviewed and updated as appropriate: allergies, current medications, past family history, past medical history, past social history, past surgical history and problem list.  Past Medical History:   Diagnosis Date    Anxiety 12/15/2023    Benign prostatic hyperplasia     Chronic  "pain disorder 12/15/2023    lower back pain    Depression 12/15/2023    Gout     Hematuria     Hypertension 12/15/2023    PONV (postoperative nausea and vomiting) 12/15/2023    pt had nausea after one hip replacement    Wears contact lenses      Past Surgical History:   Procedure Laterality Date    COLONOSCOPY      ESOPHAGOSCOPY  2021    HIP ARTHROPLASTY Bilateral     LAMINECTOMY AND MICRODISCECTOMY LUMBAR SPINE  2007    [place] CYSTO INSERTION TRANSPROSTATIC IMPLANT SINGLE N/A 12/15/2023    Procedure: CYSTOSCOPY WITH INSERTION UROLIFT;  Surgeon: Osmin Prince MD;  Location:  MAIN OR;  Service: Urology    SACROILIAC JOINT FUSION       shoulder  Review of Systems     Const: Denies chills, fever and weight loss.  CV: Denies chest pain.  Resp: Denies SOB.  GI: Denies abdominal pain, nausea and vomiting.  : Denies symptoms other than stated above.  Musculo: Denies back pain.    Objective   /98   Pulse 65   Temp 98 °F (36.7 °C)   Ht 5' 7\" (1.702 m)   Wt 112 kg (248 lb)   SpO2 98%   BMI 38.84 kg/m²     Physical Exam  Const: Appears healthy and well developed. No signs of acute distress present.  Resp: Respirations are regular and unlabored.   CV: Rate is regular. Rhythm is regular.  Abdomen: Abdomen is soft, nontender, and nondistended. Kidneys are not palpable.  : External detail exam prostate benign feeling and smooth  Psych: Patient's attitude is cooperative. Mood is normal. Affect is normal.    Current Medications     Current Outpatient Medications:     acetaminophen (TYLENOL) 500 mg tablet, Take 1,000 mg by mouth every 6 (six) hours as needed for mild pain, Disp: , Rfl:     allopurinol (ZYLOPRIM) 300 mg tablet, Take 300 mg by mouth daily, Disp: , Rfl:     cholecalciferol (VITAMIN D3) 400 units tablet, Take 2,000 Units by mouth daily, Disp: , Rfl:     cloNIDine (Catapres) 0.2 mg tablet, Take 0.2 mg by mouth 3 (three) times a day, Disp: , Rfl:     hydrocortisone (Proctosol HC) 2.5 % rectal cream, Apply " topically, Disp: , Rfl:     metoprolol tartrate (LOPRESSOR) 50 mg tablet, Take 50 mg by mouth every 12 (twelve) hours, Disp: , Rfl:     Multiple Vitamin (multivitamin) tablet, Take 1 tablet by mouth daily, Disp: , Rfl:     sertraline (ZOLOFT) 50 mg tablet, Take 50 mg by mouth daily, Disp: , Rfl:     traMADol (ULTRAM) 50 mg tablet, Take 50 mg by mouth every 6 (six) hours as needed, Disp: , Rfl:     valsartan-hydrochlorothiazide (DIOVAN-HCT) 160-25 MG per tablet, , Disp: , Rfl:         Osmin Prince MD

## 2024-11-06 ENCOUNTER — OFFICE VISIT (OUTPATIENT)
Dept: UROLOGY | Facility: CLINIC | Age: 64
End: 2024-11-06
Payer: COMMERCIAL

## 2024-11-06 VITALS
DIASTOLIC BLOOD PRESSURE: 98 MMHG | TEMPERATURE: 98 F | WEIGHT: 248 LBS | BODY MASS INDEX: 38.92 KG/M2 | OXYGEN SATURATION: 98 % | SYSTOLIC BLOOD PRESSURE: 160 MMHG | HEART RATE: 65 BPM | HEIGHT: 67 IN

## 2024-11-06 DIAGNOSIS — R35.1 NOCTURIA: ICD-10-CM

## 2024-11-06 DIAGNOSIS — N40.1 BENIGN PROSTATIC HYPERPLASIA WITH NOCTURIA: Primary | ICD-10-CM

## 2024-11-06 DIAGNOSIS — R35.1 BENIGN PROSTATIC HYPERPLASIA WITH NOCTURIA: Primary | ICD-10-CM

## 2024-11-06 DIAGNOSIS — N20.0 RENAL CALCULI: ICD-10-CM

## 2024-11-06 DIAGNOSIS — R10.9 RIGHT FLANK PAIN: ICD-10-CM

## 2024-11-06 LAB
BASOPHILS # BLD AUTO: 42 CELLS/UL (ref 0–200)
BASOPHILS NFR BLD AUTO: 0.5 %
BUN SERPL-MCNC: 20 MG/DL (ref 7–25)
BUN/CREAT SERPL: ABNORMAL (CALC) (ref 6–22)
CALCIUM SERPL-MCNC: 9.2 MG/DL (ref 8.6–10.3)
CHLORIDE SERPL-SCNC: 99 MMOL/L (ref 98–110)
CO2 SERPL-SCNC: 34 MMOL/L (ref 20–32)
CREAT SERPL-MCNC: 0.95 MG/DL (ref 0.7–1.35)
EOSINOPHIL # BLD AUTO: 252 CELLS/UL (ref 15–500)
EOSINOPHIL NFR BLD AUTO: 3 %
ERYTHROCYTE [DISTWIDTH] IN BLOOD BY AUTOMATED COUNT: 13.3 % (ref 11–15)
GFR/BSA.PRED SERPLBLD CYS-BASED-ARV: 89 ML/MIN/1.73M2
GLUCOSE SERPL-MCNC: 122 MG/DL (ref 65–99)
HCT VFR BLD AUTO: 49.5 % (ref 38.5–50)
HGB BLD-MCNC: 16.1 G/DL (ref 13.2–17.1)
LYMPHOCYTES # BLD AUTO: 2041 CELLS/UL (ref 850–3900)
LYMPHOCYTES NFR BLD AUTO: 24.3 %
MCH RBC QN AUTO: 30.3 PG (ref 27–33)
MCHC RBC AUTO-ENTMCNC: 32.5 G/DL (ref 32–36)
MCV RBC AUTO: 93.2 FL (ref 80–100)
MONOCYTES # BLD AUTO: 882 CELLS/UL (ref 200–950)
MONOCYTES NFR BLD AUTO: 10.5 %
NEUTROPHILS # BLD AUTO: 5183 CELLS/UL (ref 1500–7800)
NEUTROPHILS NFR BLD AUTO: 61.7 %
PLATELET # BLD AUTO: 175 THOUSAND/UL (ref 140–400)
PMV BLD REES-ECKER: 10.4 FL (ref 7.5–12.5)
POTASSIUM SERPL-SCNC: 3.4 MMOL/L (ref 3.5–5.3)
RBC # BLD AUTO: 5.31 MILLION/UL (ref 4.2–5.8)
SODIUM SERPL-SCNC: 141 MMOL/L (ref 135–146)
WBC # BLD AUTO: 8.4 THOUSAND/UL (ref 3.8–10.8)

## 2024-11-06 PROCEDURE — 99214 OFFICE O/P EST MOD 30 MIN: CPT | Performed by: UROLOGY

## 2024-11-12 ENCOUNTER — HOSPITAL ENCOUNTER (OUTPATIENT)
Dept: CT IMAGING | Facility: HOSPITAL | Age: 64
Discharge: HOME | End: 2024-11-12
Attending: UROLOGY
Payer: COMMERCIAL

## 2024-11-12 DIAGNOSIS — N20.0 RENAL CALCULI: ICD-10-CM

## 2024-11-12 DIAGNOSIS — R10.9 RIGHT FLANK PAIN: ICD-10-CM

## 2024-11-12 LAB — HBA1C MFR BLD HPLC: 5.8 %

## 2024-11-12 PROCEDURE — 74176 CT ABD & PELVIS W/O CONTRAST: CPT

## 2024-11-13 ENCOUNTER — RESULTS FOLLOW-UP (OUTPATIENT)
Dept: UROLOGY | Facility: CLINIC | Age: 64
End: 2024-11-13

## 2024-11-13 NOTE — TELEPHONE ENCOUNTER
----- Message from Osmin Prince MD sent at 11/13/2024  9:47 AM EST -----  Slight CT scan does not show stones.  If the patient has questions happy to answer any for him.  Please let him know  ----- Message -----  From: Ella Meehan LPN  Sent: 11/13/2024   9:33 AM EST  To: Osmin Prince MD    Looks like pt was just updating stuff in his my chart. Do you have anything you would like us to relay to pt.  ----- Message -----  From: Osmin Prince MD  Sent: 11/13/2024   9:04 AM EST  To:  Urology [facility]    Not sure what this means?

## 2024-11-13 NOTE — TELEPHONE ENCOUNTER
Left message for pt to call office back  please advise as below.     MD Ella Tavares LPN  Slight CT scan does not show stones.  If the patient has questions happy to answer any for him.  Please let him know

## 2024-12-12 ENCOUNTER — HOSPITAL ENCOUNTER (OUTPATIENT)
Dept: ULTRASOUND IMAGING | Facility: HOSPITAL | Age: 64
End: 2024-12-12
Payer: COMMERCIAL

## 2024-12-12 DIAGNOSIS — R94.5 ABNORMAL RESULTS OF LIVER FUNCTION STUDIES: ICD-10-CM

## 2024-12-12 PROCEDURE — 76700 US EXAM ABDOM COMPLETE: CPT

## 2025-01-08 LAB — HCV AB SER-ACNC: NORMAL

## 2025-05-06 RX ORDER — TAMSULOSIN HYDROCHLORIDE 0.4 MG/1
CAPSULE ORAL
COMMUNITY
End: 2025-05-08

## 2025-05-06 RX ORDER — AMLODIPINE BESYLATE 5 MG/1
TABLET ORAL
COMMUNITY
Start: 2024-11-12

## 2025-05-06 RX ORDER — PANTOPRAZOLE SODIUM 40 MG/1
TABLET, DELAYED RELEASE ORAL
COMMUNITY
End: 2025-05-08

## 2025-05-08 ENCOUNTER — OFFICE VISIT (OUTPATIENT)
Dept: FAMILY MEDICINE CLINIC | Facility: CLINIC | Age: 65
End: 2025-05-08
Payer: COMMERCIAL

## 2025-05-08 ENCOUNTER — APPOINTMENT (OUTPATIENT)
Dept: RADIOLOGY | Facility: CLINIC | Age: 65
End: 2025-05-08
Attending: FAMILY MEDICINE
Payer: COMMERCIAL

## 2025-05-08 ENCOUNTER — RESULTS FOLLOW-UP (OUTPATIENT)
Dept: FAMILY MEDICINE CLINIC | Facility: CLINIC | Age: 65
End: 2025-05-08

## 2025-05-08 ENCOUNTER — APPOINTMENT (OUTPATIENT)
Dept: RADIOLOGY | Facility: CLINIC | Age: 65
End: 2025-05-08
Payer: COMMERCIAL

## 2025-05-08 VITALS
HEART RATE: 76 BPM | OXYGEN SATURATION: 98 % | WEIGHT: 236.99 LBS | BODY MASS INDEX: 37.2 KG/M2 | SYSTOLIC BLOOD PRESSURE: 133 MMHG | HEIGHT: 67 IN | DIASTOLIC BLOOD PRESSURE: 79 MMHG

## 2025-05-08 DIAGNOSIS — E78.2 MIXED HYPERLIPIDEMIA: ICD-10-CM

## 2025-05-08 DIAGNOSIS — R35.0 BENIGN PROSTATIC HYPERPLASIA WITH URINARY FREQUENCY: ICD-10-CM

## 2025-05-08 DIAGNOSIS — G89.29 CHRONIC RIGHT SHOULDER PAIN: Primary | ICD-10-CM

## 2025-05-08 DIAGNOSIS — E66.01 CLASS 2 SEVERE OBESITY DUE TO EXCESS CALORIES WITH SERIOUS COMORBIDITY AND BODY MASS INDEX (BMI) OF 37.0 TO 37.9 IN ADULT (HCC): ICD-10-CM

## 2025-05-08 DIAGNOSIS — G89.29 CHRONIC RIGHT SHOULDER PAIN: ICD-10-CM

## 2025-05-08 DIAGNOSIS — G89.29 CHRONIC NECK PAIN: ICD-10-CM

## 2025-05-08 DIAGNOSIS — M54.16 LUMBAR RADICULOPATHY: ICD-10-CM

## 2025-05-08 DIAGNOSIS — M54.2 CHRONIC NECK PAIN: ICD-10-CM

## 2025-05-08 DIAGNOSIS — E66.812 CLASS 2 SEVERE OBESITY DUE TO EXCESS CALORIES WITH SERIOUS COMORBIDITY AND BODY MASS INDEX (BMI) OF 37.0 TO 37.9 IN ADULT (HCC): ICD-10-CM

## 2025-05-08 DIAGNOSIS — N40.1 BENIGN PROSTATIC HYPERPLASIA WITH URINARY FREQUENCY: ICD-10-CM

## 2025-05-08 DIAGNOSIS — M25.511 CHRONIC RIGHT SHOULDER PAIN: ICD-10-CM

## 2025-05-08 DIAGNOSIS — M25.511 CHRONIC RIGHT SHOULDER PAIN: Primary | ICD-10-CM

## 2025-05-08 DIAGNOSIS — F32.A ANXIETY AND DEPRESSION: ICD-10-CM

## 2025-05-08 DIAGNOSIS — E55.9 VITAMIN D DEFICIENCY: ICD-10-CM

## 2025-05-08 DIAGNOSIS — F41.9 ANXIETY AND DEPRESSION: ICD-10-CM

## 2025-05-08 DIAGNOSIS — Z23 ENCOUNTER FOR IMMUNIZATION: ICD-10-CM

## 2025-05-08 DIAGNOSIS — I10 PRIMARY HYPERTENSION: ICD-10-CM

## 2025-05-08 DIAGNOSIS — R73.03 PREDIABETES: ICD-10-CM

## 2025-05-08 PROBLEM — R11.2 PONV (POSTOPERATIVE NAUSEA AND VOMITING): Status: RESOLVED | Noted: 2023-12-15 | Resolved: 2025-05-08

## 2025-05-08 PROBLEM — Z87.448 HISTORY OF HEMATURIA: Status: RESOLVED | Noted: 2023-10-29 | Resolved: 2025-05-08

## 2025-05-08 PROBLEM — R35.1 NOCTURIA: Status: RESOLVED | Noted: 2023-11-01 | Resolved: 2025-05-08

## 2025-05-08 PROBLEM — R39.198 DECREASED URINE STREAM: Status: RESOLVED | Noted: 2023-11-01 | Resolved: 2025-05-08

## 2025-05-08 PROBLEM — Z98.890 PONV (POSTOPERATIVE NAUSEA AND VOMITING): Status: RESOLVED | Noted: 2023-12-15 | Resolved: 2025-05-08

## 2025-05-08 PROBLEM — Z09 POSTOP CHECK: Status: RESOLVED | Noted: 2024-01-24 | Resolved: 2025-05-08

## 2025-05-08 PROBLEM — R39.15 URINARY URGENCY: Status: RESOLVED | Noted: 2023-11-01 | Resolved: 2025-05-08

## 2025-05-08 PROBLEM — G89.4 CHRONIC PAIN DISORDER: Status: RESOLVED | Noted: 2023-12-15 | Resolved: 2025-05-08

## 2025-05-08 PROCEDURE — 90715 TDAP VACCINE 7 YRS/> IM: CPT

## 2025-05-08 PROCEDURE — 73030 X-RAY EXAM OF SHOULDER: CPT

## 2025-05-08 PROCEDURE — 90471 IMMUNIZATION ADMIN: CPT

## 2025-05-08 PROCEDURE — 99204 OFFICE O/P NEW MOD 45 MIN: CPT | Performed by: FAMILY MEDICINE

## 2025-05-08 NOTE — ASSESSMENT & PLAN NOTE
Chronic, stable  Continue amlodipine 5 mg daily, valsartan-HCTZ 160-25 mg daily, Metoprolol 50mg BID, clonidine 0.2mg TID   Continue to monitor

## 2025-05-08 NOTE — ASSESSMENT & PLAN NOTE
S/p lumbar fusion surgery   Follows with [facility] [place]   Chronically on Tramadol 50mg BID prn

## 2025-05-08 NOTE — ASSESSMENT & PLAN NOTE
Chronic, comes and goes, worse with over head movements  Possible muscle train in posterior shoulder muscles vs shoulder bursitis   Gave handout for shoulder exercises   Recommend obtaining shoulder xray, prn NSAIDs, prn ice/heat  Consider PT, steroid injection pending xray   Follow up prn     Orders:    XR shoulder 2+ vw right; Future

## 2025-05-08 NOTE — ASSESSMENT & PLAN NOTE
Last A1C 11/2024 5.8  Continue to encourage healthy lifestyle habits for management at this time  Continue to monitor

## 2025-05-08 NOTE — ASSESSMENT & PLAN NOTE
11/2024  lipid panel with , , HDL 45,   Lifestyle managed at this time  Continue to monitor

## 2025-05-08 NOTE — ASSESSMENT & PLAN NOTE
Cervical spine xray completed 2024 with DDD  Completed PT   Continue conservative measures, if symptoms worsen could consider spine/pain mgmt referral

## 2025-05-08 NOTE — ASSESSMENT & PLAN NOTE
Comorbid conditions: Hyperlipidemia, hypertension, prediabetes  Continue to encourage healthy lifestyle habits   Continue to monitor

## 2025-05-08 NOTE — PROGRESS NOTES
Name: Erik Thao      : 1960      MRN: [id number]  Encounter Provider: Heydi Garrett DO  Encounter Date: 2025   Encounter department: [facility]  :  Assessment & Plan  Chronic right shoulder pain  Chronic, comes and goes, worse with over head movements  Possible muscle train in posterior shoulder muscles vs shoulder bursitis   Gave handout for shoulder exercises   Recommend obtaining shoulder xray, prn NSAIDs, prn ice/heat  Consider PT, steroid injection pending xray   Follow up prn     Orders:    XR shoulder 2+ vw right; Future    Primary hypertension  Chronic, stable  Continue amlodipine 5 mg daily, valsartan-HCTZ 160-25 mg daily, Metoprolol 50mg BID, clonidine 0.2mg TID   Continue to monitor          Benign prostatic hyperplasia with urinary frequency  Follows with urology as [facility]   PSA wnl   Continue to monitor          Anxiety and depression  Chronic, stable  Continue zoloft 100mg daily          Mixed hyperlipidemia  2024  lipid panel with , , HDL 45,   Lifestyle managed at this time  Continue to monitor          Lumbar radiculopathy  S/p lumbar fusion surgery   Follows with [facility] in [place]   Chronically on Tramadol 50mg BID prn          Chronic neck pain  Cervical spine xray completed  with DDD  Completed PT   Continue conservative measures, if symptoms worsen could consider spine/pain mgmt referral          Prediabetes  Last A1C 2024 5.8  Continue to encourage healthy lifestyle habits for management at this time  Continue to monitor       Vitamin D deficiency  Last vitamin D level 39 2024  Continue p.o. supplementation  Continue to monitor       Class 2 severe obesity due to excess calories with serious comorbidity and body mass index (BMI) of 37.0 to 37.9 in adult (HCC)  Comorbid conditions: Hyperlipidemia, hypertension, prediabetes  Continue to encourage healthy lifestyle habits   Continue  to monitor            Encounter for immunization    Orders:    TDAP VACCINE GREATER THAN OR EQUAL TO 6YO IM      Return in about 6 months (around 2025) for Annual physical.      Depression Screening and Follow-up Plan: Patient's depression screening was positive with a PHQ-9 score of 5.   Patient with underlying depression and was advised to continue current medications as prescribed.       History of Present Illness   Chief Complaint   Patient presents with    New Patient Visit     Prior PCP Dr. Walden  Last seen 2024 for annual     HPI    PMH: Htn, HLD, BPH, kidney stones, Gout, anxiety/depression, lumbar spinal stenosis and radiucolpathy, cervical radiculopathy, elevated fasting glucose, vit D def, obesity    SurgHx: Dental extractions, lumbar myelogram, lumbar fusion, bilateral hip replacements, colonscopy  (5 year), urolift   Allergies: penicillin   Medications: reviewed in chart and up ot date (tramadol 50mg daily with occasional night time dose as well)   SocialHx:   Tobacco: none   Alcohol: 1-2 x per year    Relationship: single with 2 children    Career:    Specialist: Urology, [facility] in [place] for spine and ortho, GI with Dr. Coleman   Labs: completed 2024 CMP with mildly elevated LFTs, glu 106; labs completed 2024 mildly elevated LFTs, lipid panel with , , HDL 45, , A1C 5.8, vit D 39, TSH wnl,   Screening: Colon Cancer UTD due again in , PSA screening completed 10/2024     Labs due again in Fall     Recently most concerned about chronic right shoulder pain.     PHQ-2/9 Depression Screening    Little interest or pleasure in doing things: 1 - several days  Feeling down, depressed, or hopeless: 0 - not at all  Trouble falling or staying asleep, or sleeping too much: 1 - several days  Feeling tired or having little energy: 2 - more than half the days  Poor appetite or overeatin - several days  Feeling bad about yourself - or that you are a  "failure or have let yourself or your family down: 0 - not at all  Trouble concentrating on things, such as reading the newspaper or watching television: 0 - not at all  Moving or speaking so slowly that other people could have noticed. Or the opposite - being so fidgety or restless that you have been moving around a lot more than usual: 0 - not at all  Thoughts that you would be better off dead, or of hurting yourself in some way: 0 - not at all  PHQ-9 Score: 5  PHQ-9 Interpretation: Mild depression         Review of Systems   Constitutional:  Negative for appetite change, chills and fever.   Eyes:  Negative for visual disturbance.   Respiratory:  Negative for shortness of breath.    Cardiovascular:  Negative for chest pain and leg swelling.   Musculoskeletal:  Positive for arthralgias, back pain and neck pain.   Neurological:  Negative for dizziness, light-headedness and headaches.       Objective   /79 (BP Location: Left arm, Patient Position: Sitting, Cuff Size: Standard)   Pulse 76   Ht 5' 7\" (1.702 m)   Wt 107 kg (236 lb 15.9 oz)   SpO2 98%   BMI 37.12 kg/m²      Physical Exam  Vitals reviewed.   Constitutional:       General: He is not in acute distress.     Appearance: He is obese. He is not ill-appearing.   HENT:      Head: Normocephalic and atraumatic.      Nose: Nose normal.   Eyes:      Extraocular Movements: Extraocular movements intact.      Conjunctiva/sclera: Conjunctivae normal.   Cardiovascular:      Rate and Rhythm: Normal rate.   Pulmonary:      Effort: Pulmonary effort is normal.   Musculoskeletal:      Right shoulder: Tenderness present. No crepitus. Normal range of motion. Normal strength.      Left shoulder: Normal.        Arms:       Right lower leg: No edema.      Left lower leg: No edema.      Comments: Right shoulder positive Kemp   Tender to palpation over area marked above    Neurological:      General: No focal deficit present.      Mental Status: He is alert. "   Psychiatric:         Mood and Affect: Mood normal.         Behavior: Behavior normal.

## 2025-05-29 ENCOUNTER — EVALUATION (OUTPATIENT)
Dept: PHYSICAL THERAPY | Facility: CLINIC | Age: 65
End: 2025-05-29
Attending: FAMILY MEDICINE
Payer: COMMERCIAL

## 2025-05-29 DIAGNOSIS — M25.511 CHRONIC RIGHT SHOULDER PAIN: Primary | ICD-10-CM

## 2025-05-29 DIAGNOSIS — G89.29 CHRONIC RIGHT SHOULDER PAIN: Primary | ICD-10-CM

## 2025-05-29 PROCEDURE — 97162 PT EVAL MOD COMPLEX 30 MIN: CPT | Performed by: PHYSICAL THERAPIST

## 2025-05-29 NOTE — PROGRESS NOTES
PT Evaluation     Today's date: 2025  Patient name: Erik Thao  : 1960  MRN: [id number]  Referring provider: Heydi Garrett DO  Dx:   Encounter Diagnosis     ICD-10-CM    1. Chronic right shoulder pain  M25.511     G89.29                      Assessment  Impairments: abnormal coordination, abnormal or restricted ROM, impaired physical strength, lacks appropriate home exercise program, pain with function, scapular dyskinesis, poor posture  and poor body mechanics  Symptom irritability: moderate    Assessment details: Erik Thao is a pleasant 64 y.o. male presenting to PT with cc of R shoulder pain and OH weakness for past 2 years. Pt. States pain began insidiously but may be associated with a fall in Dec of 2022. Upon examination, patient was found to have objective deficits as listed below and is displaying ss consistent with Rotator cuff tendinopathy and postural dysfunction. Pt. Is experiencing subsequent functional deficits including difficulty working at desk and lifting overhead during home remodel. Pt. Was educated on role of PT to address issues and given initial HEP as well as work ergonomic education. Pt. Would benefit from skilled physical therapy to promote improved function and maximize activity tolerance.     Understanding of Dx/Px/POC: good     Prognosis: good    Goals      Short Term Goals:  - Decrease pain by 50% in 3 weeks  - Increase flexion AROM by 50% in 4 weeks  - Increase R ER strength by 50% in 4 weeks    Long Term Goals:  - Independent with comprehensive home exercise program at discharge  - Increase Functional Status Measure to: 80  - Increase strength equal to contralateral side at discharge  - Increase ROM to WFL at discharge      Plan  Patient would benefit from: skilled physical therapy  Planned modality interventions: cryotherapy, unattended electrical stimulation and thermotherapy: hydrocollator packs    Planned therapy interventions: ADL training, body  mechanics training, breathing training, flexibility, functional ROM exercises, graded exercise, home exercise program, therapeutic exercise, therapeutic activities, stretching, strengthening, postural training, neuromuscular re-education, manual therapy, massage, kinesiology taping and joint mobilization    Frequency: 1-2x week  Duration in weeks: 6  Treatment plan discussed with: patient  Plan details: Reduce soft tissue agitation -> Improve GH Mobility -> Improve scapular mechanics -> Improve GH and scap stabilization -> progress functional strengthening            Subjective Evaluation    History of Present Illness  Mechanism of injury: Erik Thao presents to PT with cc of worsening pain and pulling in R shoulder. 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 He also does a lot of home remodel and feels R shoulder pulls with oh motion. Pain is primraily posterior and extends along inferior scap spine border.   He has not tried PT for this but did complete therapy 1-2 years ago for neck issues. He finds this is a different pain. Pt. Discussed with PCP and received xray showing AC joint arthrosis. Pt. Was referred to PT.   Patient Goals  Patient goals for therapy: decreased pain, increased motion, return to sport/leisure activities, independence with ADLs/IADLs and increased strength    Pain  Current pain rating: 3  At best pain ratin  At worst pain ratin  Quality: tight, radiating, pressure and pulling  Aggravating factors: lifting and overhead activity    Social Support  Lives in: multiple-level home    Employment status: working  Hand dominance: right      Diagnostic Tests  X-ray: abnormal  Treatments  Previous treatment: medication  Current treatment: medication        Objective     Postural Observations  Seated posture: poor  Standing posture: fair    Additional Postural Observation Details  Rounded shoulders, IR at rest    Palpation  "    Additional Palpation Details  Pt. TTP along R posteiror shoulder at infraspinatus region. Mild TTP at AC joint    Cervical/Thoracic Screen   Cervical range of motion within normal limits with the following exceptions: C/s arom reduced approx 50% in all planes. (-) neg compression and distraction test.   Mild R sided nueral tension  Thoracic range of motion within normal limits with the following exceptions: Reduced extension 50%    Neurological Testing     Sensation     Shoulder   Left Shoulder   Intact: light touch    Right Shoulder   Intact: Light touch    Reflexes   Left   Biceps (C5/C6): normal (2+)    Right   Biceps (C5/C6): normal (2+)    Active Range of Motion   Left Shoulder   Normal active range of motion  External rotation 0°: 80 degrees   External rotation 45°: 90 degrees   Internal rotation 0°: WFL  Internal rotation 90°: WFL    Right Shoulder   Flexion: 150 degrees   External rotation 0°: 45 degrees   External rotation 45°: 60 degrees     Passive Range of Motion     Right Shoulder   Flexion: 150 degrees   External rotation 0°: 50 degrees   External rotation 45°: 75 degrees     Strength/Myotome Testing     Right Shoulder     Planes of Motion   Flexion: 3+   External rotation at 0°: 4-   External rotation at 45°: 3+     Tests     Right Shoulder   Positive AC shear, empty can and scapular relocation.   Negative anterior load and shift, drop arm, full can, internal rotation lag sign, , Neer's and painful arc.              Precautions: None     Daily Treatment Diary:      Initial Evaluation Date: 05/29/25  Compliance 5/29                     Visit Number 1                    Re-Eval  IE                 MC   Foto Captured Y                           5/29                     Manual                      Mobz -                     STM -                                           Ther-Ex                      C/s arom                      Doorway stretch 4x30\"                     Lev scap stretch 4x03\"  " "                   Self caudal dist 4x30\"                     No money iso Green 20x                     Open book 20x                     education Postural, work pos                                                                                       Neuro Re-Ed                                                                                                Ther-Act                                                               Modalities                      Ifc  nv                                                               "

## 2025-06-11 ENCOUNTER — APPOINTMENT (OUTPATIENT)
Dept: PHYSICAL THERAPY | Facility: CLINIC | Age: 65
End: 2025-06-11
Attending: FAMILY MEDICINE
Payer: COMMERCIAL

## 2025-06-25 ENCOUNTER — OFFICE VISIT (OUTPATIENT)
Dept: PHYSICAL THERAPY | Facility: CLINIC | Age: 65
End: 2025-06-25
Attending: FAMILY MEDICINE
Payer: COMMERCIAL

## 2025-06-25 DIAGNOSIS — M25.511 CHRONIC RIGHT SHOULDER PAIN: Primary | ICD-10-CM

## 2025-06-25 DIAGNOSIS — G89.29 CHRONIC RIGHT SHOULDER PAIN: Primary | ICD-10-CM

## 2025-06-25 PROCEDURE — 97140 MANUAL THERAPY 1/> REGIONS: CPT | Performed by: PHYSICAL THERAPIST

## 2025-06-25 PROCEDURE — 97110 THERAPEUTIC EXERCISES: CPT | Performed by: PHYSICAL THERAPIST

## 2025-06-26 NOTE — PROGRESS NOTES
"Daily Note     Today's date: 2025  Patient name: Erik Thao  : 1960  MRN: [id number]  Referring provider: Heydi Garrett DO  Dx:   Encounter Diagnosis     ICD-10-CM    1. Chronic right shoulder pain  M25.511     G89.29                      Subjective: Erik returns to PT noting new exacerbation of R shoulder pain. He states he had begun shoulder exercises but was working on house shortly after last visit and experienced significant worsening of these symptoms. He did not attend PT sessions following due to worsened pain but is now attending as it does not seem to be settling down.       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 Pt. Is interested in seeing orthopedics for potential pain control/imaging. Discussed treatment plan and focused on pain relief and modalities today with return to previously rx exercise program to improve mobility and reduce likelihood of AC.       Plan: Continue per plan of care.  Progress treatment as tolerated.       Precautions: None     Daily Treatment Diary:      Initial Evaluation Date: 25  Compliance                    Visit Number 1 2                   Re-Eval  DECLAN -                MC   Foto Captured Y -                                             Manual                      Mobz -  15'                   STM - 5'                                         Ther-Ex                      C/s arom   20x                   Doorway stretch 4x30\"  4x30\"                   Lev scap stretch 4x03\"  4x30\"                   Self caudal dist 4x30\"  4x30\"                   No money iso Green 20x  green 20x                   Open book 20x  20x                   education Postural, work pos  graded exposure exercise                                                                                     Neuro Re-Ed                                                             "                                    Ther-Act                                                               Modalities                      Ifc  nv 10' c cp

## 2025-07-02 ENCOUNTER — OFFICE VISIT (OUTPATIENT)
Dept: PHYSICAL THERAPY | Facility: CLINIC | Age: 65
End: 2025-07-02
Attending: FAMILY MEDICINE
Payer: COMMERCIAL

## 2025-07-02 DIAGNOSIS — G89.29 CHRONIC RIGHT SHOULDER PAIN: Primary | ICD-10-CM

## 2025-07-02 DIAGNOSIS — M25.511 CHRONIC RIGHT SHOULDER PAIN: Primary | ICD-10-CM

## 2025-07-02 PROCEDURE — 97140 MANUAL THERAPY 1/> REGIONS: CPT | Performed by: PHYSICAL THERAPIST

## 2025-07-02 PROCEDURE — 97110 THERAPEUTIC EXERCISES: CPT | Performed by: PHYSICAL THERAPIST

## 2025-07-02 NOTE — PROGRESS NOTES
"Daily Note     Today's date: 2025  Patient name: Erik Thao  : 1960  MRN: [id number]  Referring provider: Heydi Garrett DO  Dx:   Encounter Diagnosis     ICD-10-CM    1. Chronic right shoulder pain  M25.511     G89.29                      Subjective: Erik notes pain is improved since last session. Found ifc stim very effective.       Objective: See treatment diary below      Assessment: Erik Thao was progressed with PT interventions, focusing on appropriate technique and intensity. Pt. Required min cuing from therapist to complete. Shoulder arom seems improved today though capsular restrictions remain limiting.  Pt. Would benefit from continued physical therapy to promote improved activity tolerance and function.         Plan: Continue per plan of care.  Progress treatment as tolerated.       Precautions: None     Daily Treatment Diary:      Initial Evaluation Date: 25  Compliance                  Visit Number 1 2  3                 Re-Eval  IE -  -              MC   Foto Captured Y -  -                                         Manual                      Mobz -  15'  15'                 STM - 5'  5'                                       Ther-Ex                      C/s arom   20x  20x ea                 Doorway stretch 4x30\"  4x30\"  4x30\"                 Lev scap stretch 4x03\"  4x30\"  4x30\"                 Self caudal dist 4x30\"  4x30\"  4x30\"                 No money iso Green 20x  green 20x  Green 20x                 Open book 20x  20x  20x                 education Postural, work pos  graded exposure exercise                   Shoulder isometrics - -  10x5\"                 Scaption c psotural control - -  20x seated graded exposure                                       Neuro Re-Ed                                                                                                Ther-Act                                                               " Modalities                      Ifc  nv 10' c cp 10' c cp

## 2025-07-10 ENCOUNTER — OFFICE VISIT (OUTPATIENT)
Dept: PHYSICAL THERAPY | Facility: CLINIC | Age: 65
End: 2025-07-10
Attending: FAMILY MEDICINE
Payer: COMMERCIAL

## 2025-07-10 DIAGNOSIS — M25.511 CHRONIC RIGHT SHOULDER PAIN: Primary | ICD-10-CM

## 2025-07-10 DIAGNOSIS — G89.29 CHRONIC RIGHT SHOULDER PAIN: Primary | ICD-10-CM

## 2025-07-10 PROCEDURE — 97140 MANUAL THERAPY 1/> REGIONS: CPT | Performed by: PHYSICAL THERAPIST

## 2025-07-10 PROCEDURE — 97110 THERAPEUTIC EXERCISES: CPT | Performed by: PHYSICAL THERAPIST

## 2025-07-10 NOTE — PROGRESS NOTES
"Daily Note     Today's date: 7/10/2025  Patient name: Erik Thao  : 1960  MRN: [id number]  Referring provider: Heydi Garrett DO  Dx:   Encounter Diagnosis     ICD-10-CM    1. Chronic right shoulder pain  M25.511     G89.29                      Subjective: Erik Thao notes minimal pain in the shoulder upon arrival. States that he did have soreness for 1-2 days after last session which then resolved. He understands this is typical for recovery when addressing tendinous issues.       Objective: See treatment diary below      Assessment: Erik Thao was progressed with PT interventions, focusing on appropriate technique and intensity. Pt. Required min cuing from therapist to complete. Scapular thoracic rhytm and postural dysfunction continue to be irritating factors. Pt is seeing orthopedics as recommended to assist with further care.  Pt. Would benefit from continued physical therapy to promote improved activity tolerance and function.         Plan: Continue per plan of care.  Progress treatment as tolerated.       Precautions: None     Daily Treatment Diary:      Initial Evaluation Date: 25  Compliance 5/29  6/25  7/2  7/10               Visit Number 1 2  3  4               Re-Eval  IE -  -  -            MC   Foto Captured Y -  -  -                      5/29  6/25  7/2  7/10               Manual                      Mobz -  15'  15'  15'               STM - 5'  5'  5'                                     Ther-Ex                      C/s arom   20x  20x ea  20x ea               Doorway stretch 4x30\"  4x30\"  4x30\"  4x30\"               Lev scap stretch 4x03\"  4x30\"  4x30\"  4x30\"               Self caudal dist 4x30\"  4x30\"  4x30\"  4x30\"               No money iso Green 20x  green 20x  Green 20x  Green 30x               Open book 20x  20x  20x  1010\"               education Postural, work pos  graded exposure exercise                   Shoulder isometrics - -  10x5\"  10x5\"             " "  Scaption c psotural control - -  20x seated graded exposure  20x               T/s extensions - - - Seated 10x10\"               Neuro Re-Ed                                                                                                Ther-Act                                                               Modalities                      Ifc  nv 10' c cp 10' c cp                                                                      "

## 2025-07-15 ENCOUNTER — OFFICE VISIT (OUTPATIENT)
Dept: OBGYN CLINIC | Facility: CLINIC | Age: 65
End: 2025-07-15
Payer: COMMERCIAL

## 2025-07-15 VITALS — HEIGHT: 67 IN | WEIGHT: 237.4 LBS | BODY MASS INDEX: 37.26 KG/M2

## 2025-07-15 DIAGNOSIS — M75.41 IMPINGEMENT SYNDROME OF RIGHT SHOULDER: Primary | ICD-10-CM

## 2025-07-15 PROCEDURE — 20610 DRAIN/INJ JOINT/BURSA W/O US: CPT | Performed by: STUDENT IN AN ORGANIZED HEALTH CARE EDUCATION/TRAINING PROGRAM

## 2025-07-15 PROCEDURE — 99204 OFFICE O/P NEW MOD 45 MIN: CPT | Performed by: STUDENT IN AN ORGANIZED HEALTH CARE EDUCATION/TRAINING PROGRAM

## 2025-07-15 RX ORDER — SERTRALINE HYDROCHLORIDE 100 MG/1
1 TABLET, FILM COATED ORAL DAILY
COMMUNITY
Start: 2025-07-07 | End: 2025-07-18 | Stop reason: SDUPTHER

## 2025-07-15 RX ORDER — BUPIVACAINE HYDROCHLORIDE 5 MG/ML
3 INJECTION, SOLUTION EPIDURAL; INTRACAUDAL; PERINEURAL
Status: COMPLETED | OUTPATIENT
Start: 2025-07-15 | End: 2025-07-15

## 2025-07-15 RX ORDER — TRIAMCINOLONE ACETONIDE 40 MG/ML
40 INJECTION, SUSPENSION INTRA-ARTICULAR; INTRAMUSCULAR
Status: COMPLETED | OUTPATIENT
Start: 2025-07-15 | End: 2025-07-15

## 2025-07-15 RX ADMIN — BUPIVACAINE HYDROCHLORIDE 3 ML: 5 INJECTION, SOLUTION EPIDURAL; INTRACAUDAL; PERINEURAL at 07:30

## 2025-07-15 RX ADMIN — TRIAMCINOLONE ACETONIDE 40 MG: 40 INJECTION, SUSPENSION INTRA-ARTICULAR; INTRAMUSCULAR at 07:30

## 2025-07-18 DIAGNOSIS — F41.9 ANXIETY AND DEPRESSION: ICD-10-CM

## 2025-07-18 DIAGNOSIS — M1A.09X0 IDIOPATHIC CHRONIC GOUT OF MULTIPLE SITES WITHOUT TOPHUS: Primary | ICD-10-CM

## 2025-07-18 DIAGNOSIS — I10 PRIMARY HYPERTENSION: ICD-10-CM

## 2025-07-18 DIAGNOSIS — F32.A ANXIETY AND DEPRESSION: ICD-10-CM

## 2025-07-18 NOTE — TELEPHONE ENCOUNTER
Reason for call:   [x] Refill   [] Prior Auth  [] Other:     Office:   [x] PCP/Provider - Heydi Garrett, / [facility]      [] Specialty/Provider -     Medication: allopurinol (ZYLOPRIM) 300 mg tablet     Dose/Frequency: Take 300 mg by mouth in the morning.     Quantity: 30    Medication: amLODIPine (NORVASC) 5 mg tablet      Dose/Frequency: Take 1 tablet daily    Quantity: 30    Medication: cloNIDine (Catapres) 0.2 mg tablet     Dose/Frequency:  Take 0.2 mg by mouth in the morning and 0.2 mg in the evening and 0.2 mg before bedtime     Quantity: 90    Medication: metoprolol tartrate (LOPRESSOR) 50 mg tablet     Dose/Frequency:  Take 50 mg by mouth every 12 (twelve) hours     Quantity: 60    Medication: sertraline (ZOLOFT) 100 mg tablet      Dose/Frequency: Take 1 tablet by mouth in the morning     Quantity: 30    Medication: valsartan-hydrochlorothiazide (DIOVAN-HCT) 160-25 MG per tablet     Dose/Frequency: Take 1 tablet 2 times daily    Quantity: 60    Pharmacy: [facility] - [place], [place] - [address] [facility]   Does the patient have enough for 3 days?   [x] Yes   [] No - Send as HP to POD    Mail Away Pharmacy   Does the patient have enough for 10 days?   [] Yes   [] No - Send as HP to POD

## 2025-07-21 ENCOUNTER — OFFICE VISIT (OUTPATIENT)
Dept: PHYSICAL THERAPY | Facility: CLINIC | Age: 65
End: 2025-07-21
Attending: FAMILY MEDICINE
Payer: COMMERCIAL

## 2025-07-21 DIAGNOSIS — M25.511 CHRONIC RIGHT SHOULDER PAIN: Primary | ICD-10-CM

## 2025-07-21 DIAGNOSIS — G89.29 CHRONIC RIGHT SHOULDER PAIN: Primary | ICD-10-CM

## 2025-07-21 PROCEDURE — 97110 THERAPEUTIC EXERCISES: CPT | Performed by: PHYSICAL THERAPIST

## 2025-07-21 PROCEDURE — 97140 MANUAL THERAPY 1/> REGIONS: CPT | Performed by: PHYSICAL THERAPIST

## 2025-07-21 RX ORDER — METOPROLOL TARTRATE 50 MG
50 TABLET ORAL EVERY 12 HOURS SCHEDULED
Qty: 180 TABLET | Refills: 1 | Status: SHIPPED | OUTPATIENT
Start: 2025-07-21

## 2025-07-21 RX ORDER — ALLOPURINOL 300 MG/1
300 TABLET ORAL DAILY
Qty: 90 TABLET | Refills: 1 | Status: SHIPPED | OUTPATIENT
Start: 2025-07-21

## 2025-07-21 RX ORDER — AMLODIPINE BESYLATE 5 MG/1
5 TABLET ORAL DAILY
Qty: 90 TABLET | Refills: 1 | Status: SHIPPED | OUTPATIENT
Start: 2025-07-21

## 2025-07-21 RX ORDER — CLONIDINE HYDROCHLORIDE 0.2 MG/1
0.2 TABLET ORAL 3 TIMES DAILY
Qty: 270 TABLET | Refills: 1 | Status: SHIPPED | OUTPATIENT
Start: 2025-07-21

## 2025-07-21 RX ORDER — SERTRALINE HYDROCHLORIDE 100 MG/1
100 TABLET, FILM COATED ORAL DAILY
Qty: 90 TABLET | Refills: 1 | Status: SHIPPED | OUTPATIENT
Start: 2025-07-21

## 2025-07-21 RX ORDER — VALSARTAN AND HYDROCHLOROTHIAZIDE 160; 25 MG/1; MG/1
1 TABLET ORAL DAILY
Qty: 90 TABLET | Refills: 1 | Status: SHIPPED | OUTPATIENT
Start: 2025-07-21

## 2025-07-21 NOTE — PROGRESS NOTES
"Daily Note     Today's date: 2025  Patient name: Erik Thao  : 1960  MRN: [id number]  Referring provider: Heydi Garrett DO  Dx:   Encounter Diagnosis     ICD-10-CM    1. Chronic right shoulder pain  M25.511     G89.29                      Subjective: Erik notes receiving steroid injection with Dr. Reese and was referred back to PT.       Objective: See treatment diary below      Assessment: Tolerated treatment well. Patient demonstrated fatigue post treatment, exhibited good technique with therapeutic exercises, and would benefit from continued PT  Scap stabilziation strengthening progressed and hep updates 2* impingement pattern.       Plan: Continue per plan of care.  Progress treatment as tolerated.       Precautions: None     Daily Treatment Diary:      Initial Evaluation Date: 25  Compliance 5/29  6/25  7/2  7/10  7/21             Visit Number 1 2  3  4  5             Re-Eval  IE -  -  -  -             Foto Captured Y -  -  -  -                    5/29  6/25  7/2  7/10  7/21             Manual                      Mobz -  15'  15'  15'  15'             STM - 5'  5'  5'  5'                                   Ther-Ex                      C/s arom   20x  20x ea  20x ea  30x ea             Doorway stretch 4x30\"  4x30\"  4x30\"  4x30\"  4x30\"             Lev scap stretch 4x03\"  4x30\"  4x30\"  4x30\"  4x30\"             Self caudal dist 4x30\"  4x30\"  4x30\"  4x30\"  4x30\"             No money iso Green 20x  green 20x  Green 20x  Green 30x  green 30x             Open book 20x  20x  20x  1010\"  10x10\"             education Postural, work pos  graded exposure exercise                   Shoulder isometrics - -  10x5\"  10x5\"               Scaption c psotural control - -  20x seated graded exposure  20x               T/s extensions - - - Seated 10x10\"               Neuro Re-Ed                      Scap 4 - - - Black 20x hep updated                                                                 "   Ther-Act                                                               Modalities                      Ifc  nv 10' c cp 10' c cp

## 2025-08-22 ENCOUNTER — OFFICE VISIT (OUTPATIENT)
Dept: FAMILY MEDICINE CLINIC | Facility: CLINIC | Age: 65
End: 2025-08-22
Payer: COMMERCIAL

## 2025-08-22 VITALS
BODY MASS INDEX: 36.67 KG/M2 | OXYGEN SATURATION: 98 % | HEART RATE: 79 BPM | DIASTOLIC BLOOD PRESSURE: 75 MMHG | SYSTOLIC BLOOD PRESSURE: 125 MMHG | WEIGHT: 234.13 LBS

## 2025-08-22 DIAGNOSIS — I10 PRIMARY HYPERTENSION: Primary | ICD-10-CM

## 2025-08-22 DIAGNOSIS — G89.29 CHRONIC NECK PAIN: ICD-10-CM

## 2025-08-22 DIAGNOSIS — M25.511 CHRONIC RIGHT SHOULDER PAIN: ICD-10-CM

## 2025-08-22 DIAGNOSIS — G89.29 CHRONIC RIGHT SHOULDER PAIN: ICD-10-CM

## 2025-08-22 DIAGNOSIS — M54.2 CHRONIC NECK PAIN: ICD-10-CM

## 2025-08-22 DIAGNOSIS — M54.16 LUMBAR RADICULOPATHY: ICD-10-CM

## 2025-08-22 PROCEDURE — 99214 OFFICE O/P EST MOD 30 MIN: CPT | Performed by: FAMILY MEDICINE

## 2025-08-22 RX ORDER — VALSARTAN AND HYDROCHLOROTHIAZIDE 160; 25 MG/1; MG/1
1 TABLET ORAL 2 TIMES DAILY
Qty: 180 TABLET | Refills: 1 | Status: SHIPPED | OUTPATIENT
Start: 2025-08-22

## 2025-08-22 RX ORDER — TRAMADOL HYDROCHLORIDE 50 MG/1
50 TABLET ORAL EVERY 8 HOURS PRN
Qty: 90 TABLET | Refills: 0 | Status: SHIPPED | OUTPATIENT
Start: 2025-08-22

## (undated) DEVICE — CATH FOLEY COUDE 18FR 5ML 2 WAY CARSON LUBRICATH

## (undated) DEVICE — TUBING SUCTION 5MM X 12 FT

## (undated) DEVICE — GLOVE SRG BIOGEL 7.5

## (undated) DEVICE — MAT FLOOR STEP DRI 24 X 36 IN N-STRL

## (undated) DEVICE — STERILE SURGICAL LUBRICANT,  TUBE: Brand: SURGILUBE

## (undated) DEVICE — SINGLE PORT MANIFOLD: Brand: NEPTUNE 2

## (undated) DEVICE — PACK TUR

## (undated) DEVICE — GAUZE SPONGES,16 PLY: Brand: CURITY

## (undated) DEVICE — DISPOSABLE OR TOWEL: Brand: CARDINAL HEALTH

## (undated) DEVICE — CHLORHEXIDINE 4PCT 4 OZ

## (undated) DEVICE — SCD SEQUENTIAL COMPRESSION COMFORT SLEEVE MEDIUM KNEE LENGTH: Brand: KENDALL SCD